# Patient Record
Sex: FEMALE | Race: BLACK OR AFRICAN AMERICAN | NOT HISPANIC OR LATINO | Employment: FULL TIME | URBAN - NONMETROPOLITAN AREA
[De-identification: names, ages, dates, MRNs, and addresses within clinical notes are randomized per-mention and may not be internally consistent; named-entity substitution may affect disease eponyms.]

---

## 2017-03-16 ENCOUNTER — OFFICE VISIT (OUTPATIENT)
Dept: RETAIL CLINIC | Facility: CLINIC | Age: 32
End: 2017-03-16

## 2017-03-16 VITALS
OXYGEN SATURATION: 99 % | WEIGHT: 138 LBS | TEMPERATURE: 97.8 F | RESPIRATION RATE: 18 BRPM | HEART RATE: 67 BPM | BODY MASS INDEX: 27.82 KG/M2 | DIASTOLIC BLOOD PRESSURE: 84 MMHG | SYSTOLIC BLOOD PRESSURE: 130 MMHG | HEIGHT: 59 IN

## 2017-03-16 DIAGNOSIS — J01.00 ACUTE NON-RECURRENT MAXILLARY SINUSITIS: Primary | ICD-10-CM

## 2017-03-16 PROCEDURE — 99203 OFFICE O/P NEW LOW 30 MIN: CPT | Performed by: NURSE PRACTITIONER

## 2017-03-16 RX ORDER — AMOXICILLIN AND CLAVULANATE POTASSIUM 875; 125 MG/1; MG/1
1 TABLET, FILM COATED ORAL 2 TIMES DAILY
Qty: 20 TABLET | Refills: 0 | Status: SHIPPED | OUTPATIENT
Start: 2017-03-16 | End: 2017-03-26

## 2017-03-16 RX ORDER — FLUTICASONE PROPIONATE 50 MCG
2 SPRAY, SUSPENSION (ML) NASAL DAILY
Qty: 1 BOTTLE | Refills: 0 | Status: SHIPPED | OUTPATIENT
Start: 2017-03-16 | End: 2017-04-15

## 2017-03-16 NOTE — PROGRESS NOTES
"Subjective   Tristan Lackey is a 31 y.o. female.     HPI Comments: Patient reports a one week history of sinus pain on right over forehead and under cheekbone. Foul odor from sinuses, hoarseness on and off, PND but no fever. Tried mucinex with no relief. Nasal drainage is yellow and thick with clumps in it at times.        No Known Allergies      The following portions of the patient's history were reviewed and updated as appropriate: allergies, current medications, past family history, past medical history, past social history, past surgical history and problem list.    Review of Systems   Constitutional: Negative for chills and fever.   HENT: Positive for congestion, facial swelling, postnasal drip, rhinorrhea and sinus pressure. Negative for ear pain and sore throat.    Respiratory: Positive for cough. Negative for wheezing.    Neurological: Positive for headaches.       Objective     Visit Vitals   • /84 (BP Location: Right arm)   • Pulse 67   • Temp 97.8 °F (36.6 °C) (Oral)   • Resp 18   • Ht 59\" (149.9 cm)   • Wt 138 lb (62.6 kg)   • SpO2 99%   • BMI 27.87 kg/m2       Physical Exam  General Appearance:  Well-appearing, well-developed, well hydrated, well nourished, no acute distress, alert and oriented, appears stated age, comfortable, pleasant, cooperative  Head/face:  Normocephalic, atraumatic with right  maxillary and frontal sinus tenderness to palpation  Eyes:  Pupils equal, sclera clear, EOMI  Ears:  Bilateral TMs are dull gray with diffuse light reflex, canals are clear, no pinna or tragus tenderness with palpation  Nose/Sinuses:  Nares are moderately congested bilaterally  Mouth/Throat:  Posterior pharynx is moderately erythematous with yellow drainage  Neck:  Supple without lymphadenopathy or thyromegaly; trachea is midline  Lungs:  Clear to auscultation bilaterally  Heart:  Regular rate and rhythm without murmur, gallop or rub  Neurologic:  Alert; no focal deficits; age appropriate behavior " and speech      Assessment/Plan   Tristan was seen today for nasal congestion.    Diagnoses and all orders for this visit:    Acute non-recurrent maxillary sinusitis    Other orders  -     amoxicillin-clavulanate (AUGMENTIN) 875-125 MG per tablet; Take 1 tablet by mouth 2 (Two) Times a Day for 10 days.  -     loratadine-pseudoephedrine (CLARITIN-D 24-hour)  MG per 24 hr tablet; Take 1 tablet by mouth Daily for 10 days.  -     fluticasone (FLONASE) 50 MCG/ACT nasal spray; 2 sprays into each nostril Daily for 30 days.      Discussed dosing, side effects, recommended other symptomatic care.  Patient instructions and visit summary given as documented. Patient should follow up with primary care provider if symptoms worsen, fail to resolve or other symptoms need attention. Patient/family agree to the above.            TOSHIA Zeng

## 2017-03-16 NOTE — PATIENT INSTRUCTIONS
Sinusitis, Adult    You have been diagnosed with a sinus infection (sinusitis).  Sinusitis is redness, soreness, and inflammation of the paranasal sinuses. Paranasal sinuses are air pockets within the bones of your face. They are located beneath your eyes, in the middle of your forehead, and above your eyes. In healthy paranasal sinuses, mucus is able to drain out, and air is able to circulate through them by way of your nose. However, when your paranasal sinuses are inflamed, mucus and air can become trapped. This can allow bacteria and other germs to grow and cause infection.    Symptoms of sinusitis may include pain and pressure around the affected sinuses, upper toothache, earache, headache, bad breath, decreased sense of smell and taste, cough, fatigue, fever, thick drainage from your nose, which often is green and may contain pus, or swelling and warmth over the affected sinuses.    An antibiotic is prescribed for you today that needs to be taken until gone, whether or not you feel better. It is recommended that you eat yogurt while taking an antibiotic. Mucinex may help with thinning of post nasal drip and cough. Use Motrin or Tylenol for fever or pain. Increase your intake of fluids, get plenty of rest, use a humidifier or inhale steam 3-4 times a day (for example, sit in the bathroom with the shower running), and apply a warm, moist washcloth to your face 3-4 times a day. You may also use saline nasal spray 2 sprays in each nostril several times a day to help moisten and clean your sinuses.    Follow up with your primary care provider if no improvement after 72 hours, or sooner for any increase in symptoms or concerns.     SEEK IMMEDIATE MEDICAL CARE IF:  · You have increasing pain or severe headaches.  · You have nausea, vomiting, or drowsiness.  · You have swelling around your face.  · You have vision problems.  · You have a stiff neck.  · You have difficulty breathing.     This information is not  intended to replace advice given to you by your health care provider. Make sure you discuss any questions you have with your health care provider.     Pt verbalized understanding, visit summary given.      TOSHIA Zeng